# Patient Record
Sex: FEMALE | Race: WHITE | NOT HISPANIC OR LATINO | Employment: UNEMPLOYED | URBAN - NONMETROPOLITAN AREA
[De-identification: names, ages, dates, MRNs, and addresses within clinical notes are randomized per-mention and may not be internally consistent; named-entity substitution may affect disease eponyms.]

---

## 2017-06-28 ENCOUNTER — PROCEDURE VISIT (OUTPATIENT)
Dept: OBSTETRICS AND GYNECOLOGY | Facility: CLINIC | Age: 47
End: 2017-06-28

## 2017-06-28 ENCOUNTER — CONSULT (OUTPATIENT)
Dept: SURGERY | Facility: CLINIC | Age: 47
End: 2017-06-28

## 2017-06-28 VITALS
WEIGHT: 114 LBS | SYSTOLIC BLOOD PRESSURE: 140 MMHG | HEIGHT: 64 IN | DIASTOLIC BLOOD PRESSURE: 90 MMHG | BODY MASS INDEX: 19.46 KG/M2

## 2017-06-28 VITALS
BODY MASS INDEX: 19.29 KG/M2 | SYSTOLIC BLOOD PRESSURE: 142 MMHG | DIASTOLIC BLOOD PRESSURE: 86 MMHG | WEIGHT: 113 LBS | HEIGHT: 64 IN

## 2017-06-28 DIAGNOSIS — N63.0 BREAST LUMP: Primary | ICD-10-CM

## 2017-06-28 DIAGNOSIS — R92.8 ABNORMAL MAMMOGRAM: Primary | ICD-10-CM

## 2017-06-28 PROCEDURE — 99203 OFFICE O/P NEW LOW 30 MIN: CPT | Performed by: SURGERY

## 2017-06-28 PROCEDURE — 99213 OFFICE O/P EST LOW 20 MIN: CPT | Performed by: NURSE PRACTITIONER

## 2017-06-28 RX ORDER — ALBUTEROL SULFATE 2.5 MG/3ML
2.5 SOLUTION RESPIRATORY (INHALATION) EVERY 4 HOURS PRN
COMMUNITY

## 2017-06-28 RX ORDER — ERGOCALCIFEROL (VITAMIN D2) 10 MCG
400 TABLET ORAL DAILY
COMMUNITY

## 2017-06-28 NOTE — PROGRESS NOTES
Subjective   Deacon Connell is a 46 y.o. female     History of Present Illness referred from OB/GYN service after patient underwent a mammogram and ultrasound of her right breast for concern about a palpable mass in the right breast.  Ultrasound was unremarkable of the right breast.  Mammogram was unremarkable of the right breast but did demonstrate a cluster of concerning calcifications in the middle of her left breast.  Patient has no prior mammograms for comparison.  Patient is currently breast-feeding a 4-year-old.  She's currently attempting to wean her off of breast feeding and she only procedure from her left breast.  No first-degree relatives with any breast issues.  Patient has no complaints regarding her left breast and she does not feel the mass in the right breast any more.  Reviewed the mammograms and agree with radiology that this cluster of calcifications is of concern and should be stereotactically biopsied        Review of Systems   Constitutional: Negative.    HENT: Negative.    Eyes: Negative.    Respiratory: Negative.    Cardiovascular: Negative.    Gastrointestinal: Negative.    Endocrine: Negative.    Genitourinary: Negative.    Musculoskeletal: Negative.    Skin: Negative.    Allergic/Immunologic: Negative.    Neurological: Negative.    Hematological: Negative.    Psychiatric/Behavioral: Negative.      Past Medical History:   Diagnosis Date   • Asthma    • Encounter for follow-up examination after completed treatment for conditions other than malignant neoplasm    • Encounter for gynecological examination (general) (routine) without abnormal findings    • Other specified noninflammatory disorder of vagina    • Pain in pelvis    • Routine postpartum follow-up      No past surgical history on file.  Family History   Problem Relation Age of Onset   • Asthma Other    • Breast cancer Other    • Cancer Other    • Heart disease Other    • Hypertension Other    • Lung cancer Other    • Migraines Other     • Osteoporosis Other    • Rheum arthritis Other    • Breast cancer Paternal Grandmother      Social History     Social History   • Marital status:      Spouse name: N/A   • Number of children: N/A   • Years of education: N/A     Occupational History   • Not on file.     Social History Main Topics   • Smoking status: Former Smoker   • Smokeless tobacco: Not on file   • Alcohol use No   • Drug use: No   • Sexual activity: Yes     Partners: Male     Birth control/ protection: None     Other Topics Concern   • Not on file     Social History Narrative       Objective   Physical Exam   Constitutional: She is oriented to person, place, and time. She appears well-developed and well-nourished. No distress.   HENT:   Head: Normocephalic and atraumatic.   Nose: Nose normal.   Eyes: Conjunctivae are normal. No scleral icterus.   Neck: Normal range of motion. No tracheal deviation present. No thyromegaly present.   Cardiovascular: Normal rate, regular rhythm and normal heart sounds.    No murmur heard.  Pulmonary/Chest: Effort normal and breath sounds normal. No respiratory distress. She has no wheezes. She has no rales. She exhibits no tenderness.       Abdominal: Soft. She exhibits no distension. There is no tenderness. There is no rebound and no guarding. No hernia.   Musculoskeletal: She exhibits no tenderness or deformity.   Neurological: She is alert and oriented to person, place, and time.   Skin: Skin is warm and dry. No rash noted.   Psychiatric: She has a normal mood and affect. Her behavior is normal. Judgment and thought content normal.   Vitals reviewed.      Assessment/Plan concerning cluster Calcifications left breast on patient's first ever mammogram.  Patient is breast feeding.  Recommend left breast stereotactic mammotome biopsy and leaving a clip.  I fully discussed the procedure alternatives risk and benefits with the patient and she clearly understands and wishes to proceed      The encounter  diagnosis was Abnormal mammogram.                     This document has been electronically signed by Chinmay Taylor MD on June 28, 2017 11:51 AM      EMR Dragon/Transcription disclaimer:  Much of this encounter note is on electronic transcription/translation of spoken language to printed text.  The electronic translation of spoken language may permit erroneous or at times, nonsensical words or phrases to be inadvertently transcribed;  Although I have reviewed the note for such errors, some may still exist.

## 2017-06-28 NOTE — PROGRESS NOTES
"Subjective   History of Present Illness    Deacon Connell is a 46 y.o. female who presents with c/o right breast lump felt for a few days. She is currently breast feeding, mainly breastfeeding only on left side. She was only pumping on the right side because of the low milk supply, but recently she has been unable to pump anything on that side. The lump is nontender and mobile.     Current contraception: None  Sexually active?: Yes  Postmenopausal?: No  HRT?: no  Hysterectomy?: no    Date last pap: 2016  History of abnormal Pap smear: no  Date of last mammogram: never  History of abnormal mammogram: no    /90  Ht 64\" (162.6 cm)  Wt 114 lb (51.7 kg)  LMP 05/28/2017 (Approximate)  Breastfeeding? Yes Comment: currently nursing her 4 yr old  BMI 19.57 kg/m2    Past Medical History:   Diagnosis Date   • Asthma    • Encounter for follow-up examination after completed treatment for conditions other than malignant neoplasm    • Encounter for gynecological examination (general) (routine) without abnormal findings    • Other specified noninflammatory disorder of vagina    • Pain in pelvis    • Routine postpartum follow-up        No past surgical history on file.    The following portions of the patient's history were reviewed and updated as appropriate: allergies, current medications, past family history, past medical history, past social history, past surgical history and problem list.    Review of Systems    Constitutional:  No fatigue, No weight loss, No weight gain, No fever and No chills   Respiratory: No dyspnea, No cough, No hemopytysis, No wheezing and No pleuritic pain   Cardiovascular: No chest pain, No palpitations, No arrhythmia, No orthopnea, No noctural dyspnea, No edema and No claudication   Breasts: No discharge from nipple, No breast tenderness and Breast mass   Gastrointestinal: No loss of appetite, No dysphagia, No abdominal pain, No nausea, No vomiting, No change in bowel habits, No diarrhea, No " constipation and No blood in stool   Genitourinary: No increased frequency of urination, No dysuria, No hematuria, No nocturia, No urinary incontinence, No vaginal discharge, No abnormal vaginal bleeding, No pelvic pain, No menstrual problem and No menopausal problem   Skin: No skin rash, No skin lesion, No dry skin, No pruritus and No nail problem   Neurologic: No headache, No dizziness, No lightheadedness, No syncope, No vertigo, No weakness, No numbness, No tremor and No paresthesia   Psychiatric: No difficulty sleeping, No mood swings, No feeling anxious, No confusion and No memory loss          Objective   Physical Exam   Pulmonary/Chest:       2 cm nontender, mobile breast mass located at 3 olock adjacent to areola of right breast       General:  Alert and oriented x 3, Cooperative, Well developed & well nourished and No acute distress   Breast: No nipple discharge, No bleeding and Breast mass, breast asymmetrical (left larger than right)       Assessment/Plan   Deacon was seen today for breast mass.    Diagnoses and all orders for this visit:    Breast lump  -     Mammo Diagnostic Digital Tomosynthesis Bilateral With CAD; Future  -     US Breast Right Limited; Future      All questions answered.  Recommended self breast exam  Mammogram.  Follow up as needed.

## 2017-06-30 ENCOUNTER — TELEPHONE (OUTPATIENT)
Dept: SURGERY | Facility: CLINIC | Age: 47
End: 2017-06-30

## 2017-06-30 NOTE — TELEPHONE ENCOUNTER
was made aware of patients decision to get second opinion and Paula Pederson called Radiology and canceled the Mammotome biopsy that was scheduled for July 11th at 7:00 a.m.

## 2017-06-30 NOTE — TELEPHONE ENCOUNTER
PT CALLED TO SAY THAT SHE WANTED TO CANCEL HER SURGERY ON 7-11-17.  SHE IS GOING TO GET A 2ND OPINION FROM A DR IN [facility].

## 2017-07-11 ENCOUNTER — HOSPITAL ENCOUNTER (OUTPATIENT)
Dept: MAMMOGRAPHY | Facility: HOSPITAL | Age: 47
Discharge: HOME | End: 2017-07-11